# Patient Record
Sex: FEMALE | Race: BLACK OR AFRICAN AMERICAN | NOT HISPANIC OR LATINO | Employment: OTHER | URBAN - METROPOLITAN AREA
[De-identification: names, ages, dates, MRNs, and addresses within clinical notes are randomized per-mention and may not be internally consistent; named-entity substitution may affect disease eponyms.]

---

## 2020-11-16 PROBLEM — S02.19XA TEMPORAL BONE FRACTURE: Status: ACTIVE | Noted: 2020-11-16

## 2020-11-16 PROBLEM — I60.9 SAH (SUBARACHNOID HEMORRHAGE): Status: ACTIVE | Noted: 2020-11-16
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
## 2020-11-17 PROBLEM — J96.01 ACUTE RESPIRATORY FAILURE WITH HYPOXIA AND HYPERCARBIA: Status: ACTIVE | Noted: 2020-11-17

## 2020-11-17 PROBLEM — J96.02 ACUTE RESPIRATORY FAILURE WITH HYPOXIA AND HYPERCARBIA: Status: ACTIVE | Noted: 2020-11-17

## 2020-11-20 PROBLEM — D64.9 ANEMIA: Status: ACTIVE | Noted: 2020-11-20

## 2020-11-20 PROBLEM — S06.5XAA SUBDURAL HEMATOMA: Status: ACTIVE | Noted: 2020-11-20

## 2020-11-26 PROBLEM — R45.1 AGITATION: Status: ACTIVE | Noted: 2020-11-26

## 2020-12-03 PROBLEM — S06.6XAA TRAUMATIC SUBARACHNOID HEMORRHAGE: Status: ACTIVE | Noted: 2020-11-16

## 2020-12-03 PROBLEM — J96.01 ACUTE RESPIRATORY FAILURE WITH HYPOXIA AND HYPERCARBIA: Status: RESOLVED | Noted: 2020-11-17 | Resolved: 2020-12-03

## 2020-12-03 PROBLEM — J96.02 ACUTE RESPIRATORY FAILURE WITH HYPOXIA AND HYPERCARBIA: Status: RESOLVED | Noted: 2020-11-17 | Resolved: 2020-12-03

## 2020-12-08 PROBLEM — E87.6 HYPOKALEMIA: Status: ACTIVE | Noted: 2020-12-08

## 2020-12-08 PROBLEM — E87.0 HYPERNATREMIA: Status: ACTIVE | Noted: 2020-12-08

## 2021-04-06 PROBLEM — R56.1 SEIZURE AFTER HEAD INJURY: Status: ACTIVE | Noted: 2021-04-06

## 2021-09-07 PROBLEM — G81.91 RIGHT HEMIPARESIS: Status: ACTIVE | Noted: 2021-09-07

## 2021-09-07 PROBLEM — I10 ESSENTIAL HYPERTENSION: Status: ACTIVE | Noted: 2021-09-07

## 2021-09-07 PROBLEM — G81.10 SPASTIC HEMIPLEGIA AFFECTING DOMINANT SIDE: Status: ACTIVE | Noted: 2021-09-07

## 2021-09-07 PROBLEM — Z12.4 CERVICAL CANCER SCREENING: Status: ACTIVE | Noted: 2021-09-07

## 2021-09-07 PROBLEM — G40.219: Status: ACTIVE | Noted: 2021-09-07

## 2021-09-08 ENCOUNTER — PATIENT OUTREACH (OUTPATIENT)
Dept: ADMINISTRATIVE | Facility: HOSPITAL | Age: 36
End: 2021-09-08

## 2021-09-16 PROBLEM — R21 SKIN RASH: Status: ACTIVE | Noted: 2021-09-16

## 2021-12-08 PROBLEM — Z12.4 CERVICAL CANCER SCREENING: Status: RESOLVED | Noted: 2021-09-07 | Resolved: 2021-12-08

## 2022-01-07 PROBLEM — H53.40 VISUAL FIELD DEFECT: Status: ACTIVE | Noted: 2022-01-07

## 2022-07-19 PROBLEM — Z87.828 HISTORY OF TRAUMATIC HEAD INJURY: Status: ACTIVE | Noted: 2022-07-19

## 2022-09-06 PROBLEM — Z43.1: Status: ACTIVE | Noted: 2020-12-09

## 2022-09-06 PROBLEM — I82.411 ACUTE DEEP VEIN THROMBOSIS (DVT) OF FEMORAL VEIN OF RIGHT LOWER EXTREMITY: Status: ACTIVE | Noted: 2020-12-09

## 2022-09-06 PROBLEM — Z74.09 IMPAIRED MOBILITY AND ADLS: Status: ACTIVE | Noted: 2020-12-09

## 2022-09-06 PROBLEM — S06.9XAA TRAUMATIC BRAIN INJURY: Status: ACTIVE | Noted: 2020-12-09

## 2022-09-06 PROBLEM — R13.10 DYSPHAGIA: Status: ACTIVE | Noted: 2020-12-09

## 2022-09-06 PROBLEM — Z78.9 IMPAIRED MOBILITY AND ADLS: Status: ACTIVE | Noted: 2020-12-09

## 2023-01-20 PROBLEM — M24.411 RECURRENT DISLOCATION OF RIGHT SHOULDER: Status: ACTIVE | Noted: 2023-01-20

## 2023-07-31 PROBLEM — E66.01 MORBID OBESITY: Status: ACTIVE | Noted: 2023-07-31

## 2023-07-31 PROBLEM — Z43.1: Status: RESOLVED | Noted: 2020-12-09 | Resolved: 2023-07-31

## 2023-07-31 PROBLEM — I82.411 ACUTE DEEP VEIN THROMBOSIS (DVT) OF FEMORAL VEIN OF RIGHT LOWER EXTREMITY: Status: RESOLVED | Noted: 2020-12-09 | Resolved: 2023-07-31

## 2024-02-01 PROBLEM — E87.6 HYPOKALEMIA: Status: RESOLVED | Noted: 2020-12-08 | Resolved: 2024-02-01

## 2024-02-01 PROBLEM — R45.1 AGITATION: Status: RESOLVED | Noted: 2020-11-26 | Resolved: 2024-02-01

## 2024-02-01 PROBLEM — E87.0 HYPERNATREMIA: Status: RESOLVED | Noted: 2020-12-08 | Resolved: 2024-02-01

## 2024-02-01 PROBLEM — S06.5XAA SUBDURAL HEMATOMA: Status: RESOLVED | Noted: 2020-11-20 | Resolved: 2024-02-01

## 2024-05-01 PROBLEM — I60.9 SAH (SUBARACHNOID HEMORRHAGE): Status: RESOLVED | Noted: 2020-11-16 | Resolved: 2024-05-01

## 2024-08-01 PROBLEM — R73.03 PREDIABETES: Status: ACTIVE | Noted: 2024-08-01

## 2024-08-01 PROBLEM — Z72.0 TOBACCO ABUSE: Status: ACTIVE | Noted: 2024-08-01

## 2025-02-19 DIAGNOSIS — I10 ESSENTIAL HYPERTENSION: ICD-10-CM

## 2025-02-19 RX ORDER — FUROSEMIDE 20 MG/1
20 TABLET ORAL
Qty: 30 TABLET | Refills: 1 | Status: SHIPPED | OUTPATIENT
Start: 2025-02-19

## 2025-02-19 NOTE — TELEPHONE ENCOUNTER
Refill Routing Note   Medication(s) are not appropriate for processing by [facility] for the following reason(s):        Outside of protocol    [facility] action(s):  Route      Medication Therapy Plan: PRN USAGE(OP)      Appointments  past 12m or future 3m with PCP    Date Provider   Last Visit   12/19/2024 Yolis Gruber MD   Next Visit   5/8/2025 Yolis Gruber MD   ED visits in past 90 days: 0        Note composed:1:01 PM 02/19/2025

## 2025-02-19 NOTE — TELEPHONE ENCOUNTER
No care due was identified.  [facility] Embedded Care Due Messages. Reference number: [id number].   2/19/2025 12:41:35 PM CST

## 2025-04-16 DIAGNOSIS — I10 ESSENTIAL HYPERTENSION: ICD-10-CM

## 2025-04-16 RX ORDER — FUROSEMIDE 20 MG/1
20 TABLET ORAL
Qty: 30 TABLET | Refills: 1 | Status: SHIPPED | OUTPATIENT
Start: 2025-04-16

## 2025-04-16 NOTE — TELEPHONE ENCOUNTER
Refill Routing Note   Medication(s) are not appropriate for processing by [facility] for the following reason(s):        Outside of protocol    [facility] action(s):  Route      Medication Therapy Plan: PRN USAGE(OP)      Appointments  past 12m or future 3m with PCP    Date Provider   Last Visit   12/19/2024 Yolis Gruber MD   Next Visit   5/8/2025 Yolis Gruber MD   ED visits in past 90 days: 0        Note composed:11:57 AM 04/16/2025

## 2025-04-16 NOTE — TELEPHONE ENCOUNTER
No care due was identified.  [facility] Embedded Care Due Messages. Reference number: [id number].   4/16/2025 11:47:24 AM CDT